# Patient Record
(demographics unavailable — no encounter records)

---

## 2024-10-23 NOTE — HISTORY OF PRESENT ILLNESS
[Sudden] : sudden [Rest] : rest [Student] : Work status: student [0] : 0 [de-identified] : Patient here for right fib. Patient being treated for nondisplaced right proximal 3rd fibular shaft fracture. Patient states he has no pain and no complaints at this time. Patient came into office ambulating on his own. FX coded 9/11/24 [] : Post Surgical Visit: no [FreeTextEntry1] : Right tib/fib [FreeTextEntry3] : 9/6/2024 [FreeTextEntry5] :  Patient states he was playing soccer, and someone kicked him

## 2024-10-23 NOTE — ASSESSMENT
[FreeTextEntry1] : 19 yo male presenting today for f/u of right nondisplaced right proximal 3rd fibular shaft fracture. X-rays demonstrating increased callous formation at fracture site. Patient has no pain and no complaints. Recommend continued non-surgical management. -Continue to avoid strenuous/impact related activities, no gym/sports  -Rest, ice, compression, elevation, NSAIDs PRN for pain.  -All questions answered -F/u 6 weeks with repeat right tib/fib x-rays  The diagnosis was explained in detail. The potential non-surgical and surgical treatments were reviewed. The relative risks and benefits of each option were considered relative to the patients age, activity level, medical history, symptom severity and previously attempted treatments.  The patient was advised to consult with their primary medical provider prior to initiation of any new medications to reduce the risk of adverse effects specific to their long-term home medications and medical history. The risk of gastrointestinal irritation and kidney injury specific to long-term NSAID use was discussed.   Entered by Riky Cook PA-C acting as scribe. Dr. Escoto Attestation The documentation recorded by the scribe, in my presence, accurately reflects the service I, Dr. Escoot, personally performed, and the decisions made by me with my edits as appropriate.

## 2024-10-23 NOTE — PHYSICAL EXAM
[NL (40)] : plantar flexion 40 degrees [NL 30)] : inversion 30 degrees [NL (20)] : eversion 20 degrees [5___] : [facility] 5[unfilled]/5 [2+] : posterior tibialis pulse: 2+ [Right] : right tibia/fibula [The fracture is in acceptable alignment. There is progression in healing seen] : The fracture is in acceptable alignment. There is progression in healing seen [] : no medial malleolus tenderness [de-identified] : nondisplaced proximal 3rd fibula fracture

## 2024-11-13 NOTE — REVIEW OF SYSTEMS
[Hearing Loss] : hearing loss [Patient Intake Form Reviewed] : Patient intake form was reviewed [Negative] : Ear [Ear Pain] : no ear pain [Ear Itch] : no ear itch [Dizziness] : no dizziness [Vertigo] : no vertigo [Recurrent Ear Infections] : no recurrent ear infections [Ear Drainage] : no ear drainage [Ear Noises] : no ear noises [Lightheadedness] : no lightheadedness [de-identified] : hearing loss in right ear / left ear is good in hearing

## 2024-11-13 NOTE — PROCEDURE
[FreeTextEntry6] : Indication: Cannot adequately examine ear canal/tympanic membrane with otoscope. Findings ad tm intact mobile

## 2024-11-13 NOTE — REASON FOR VISIT
[Initial Consultation] : an initial consultation for [Hearing Loss] : hearing loss [Other: ______] : provided by JOVITA [FreeTextEntry2] : right ear hearing loss since birth [Interpreters_IDNumber] : [id number] [Interpreters_FullName] : mayo [TWNoteComboBox1] : Indian

## 2024-11-13 NOTE — DATA REVIEWED
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 Type A tymps, AU. Testing via inserts: AD- Profound SNHL. AS- WNL. Recs: 1) F/u w/ MD 2) CROS pending med clearance / consider CI eval 3) Annual

## 2024-12-04 NOTE — PHYSICAL EXAM
[NL (40)] : plantar flexion 40 degrees [NL 30)] : inversion 30 degrees [NL (20)] : eversion 20 degrees [5___] : [facility] 5[unfilled]/5 [2+] : posterior tibialis pulse: 2+ [Right] : right tibia/fibula [The fracture is in acceptable alignment. There is progression in healing seen] : The fracture is in acceptable alignment. There is progression in healing seen [] : patient ambulates without assistive device [de-identified] : nondisplaced proximal 3rd fibula fracture with increased callous formation

## 2024-12-04 NOTE — ASSESSMENT
[FreeTextEntry1] : 20 yo male presenting today for f/u of right nondisplaced right proximal 3rd fibular shaft fracture treated non-surgically. Patient has no pain and complaints. -Activities as tolerated, advance to his tolerance -Rest, ice, compression, elevation, NSAIDs PRN for pain.  -All questions answered -F/u PRN  The diagnosis was explained in detail. The potential non-surgical and surgical treatments were reviewed. The relative risks and benefits of each option were considered relative to the patients age, activity level, medical history, symptom severity and previously attempted treatments.  The patient was advised to consult with their primary medical provider prior to initiation of any new medications to reduce the risk of adverse effects specific to their long-term home medications and medical history. The risk of gastrointestinal irritation and kidney injury specific to long-term NSAID use was discussed.  Entered by Riky Cook PA-C acting as scribe. Dr. Escoto Attestation The documentation recorded by the scribe, in my presence, accurately reflects the service I, Dr. Escoto, personally performed, and the decisions made by me with my edits as appropriate.

## 2024-12-04 NOTE — HISTORY OF PRESENT ILLNESS
[Sudden] : sudden [0] : 0 [Rest] : rest [Student] : Work status: student [de-identified] : Patient here for right fib. Patient being treated for nondisplaced right proximal 3rd fibular shaft fracture. Patient states he has no pain and no complaints at this time. Patient came into office ambulating on his own. FX coded 9/11/24 [] : Post Surgical Visit: no [FreeTextEntry1] : Right tib/fib [FreeTextEntry3] : 9/6/2024 [FreeTextEntry5] :  Patient states he was playing soccer, and someone kicked him